# Patient Record
Sex: FEMALE | URBAN - NONMETROPOLITAN AREA
[De-identification: names, ages, dates, MRNs, and addresses within clinical notes are randomized per-mention and may not be internally consistent; named-entity substitution may affect disease eponyms.]

---

## 2023-10-03 ENCOUNTER — OFFICE VISIT (OUTPATIENT)
Dept: URBAN - NONMETROPOLITAN AREA CLINIC 4 | Facility: CLINIC | Age: 53
End: 2023-10-03
Payer: COMMERCIAL

## 2023-10-03 VITALS
HEART RATE: 64 BPM | DIASTOLIC BLOOD PRESSURE: 78 MMHG | TEMPERATURE: 97.3 F | SYSTOLIC BLOOD PRESSURE: 124 MMHG | HEIGHT: 64 IN | WEIGHT: 227.6 LBS | BODY MASS INDEX: 38.86 KG/M2

## 2023-10-03 DIAGNOSIS — R11.0 NAUSEA: ICD-10-CM

## 2023-10-03 DIAGNOSIS — K21.9 GASTROESOPHAGEAL REFLUX DISEASE WITHOUT ESOPHAGITIS: ICD-10-CM

## 2023-10-03 DIAGNOSIS — R13.19 ESOPHAGEAL DYSPHAGIA: ICD-10-CM

## 2023-10-03 PROBLEM — 40890009: Status: ACTIVE | Noted: 2023-10-03

## 2023-10-03 PROBLEM — 266435005: Status: ACTIVE | Noted: 2023-10-03

## 2023-10-03 PROBLEM — 422587007: Status: ACTIVE | Noted: 2023-10-03

## 2023-10-03 PROCEDURE — 99245 OFF/OP CONSLTJ NEW/EST HI 55: CPT | Performed by: PHYSICIAN ASSISTANT

## 2023-10-03 PROCEDURE — 99205 OFFICE O/P NEW HI 60 MIN: CPT | Performed by: PHYSICIAN ASSISTANT

## 2023-10-03 RX ORDER — SUCRALFATE 1 G/1
1 TABLET ON AN EMPTY STOMACH TABLET ORAL TWICE A DAY
Status: ACTIVE | COMMUNITY

## 2023-10-03 RX ORDER — FAMOTIDINE 10 MG/1
1 TABLET AS NEEDED TABLET, FILM COATED ORAL TWICE A DAY
Qty: 60 TABLET | Refills: 0 | OUTPATIENT
Start: 2023-10-03

## 2023-10-03 RX ORDER — SUCRALFATE 1 G/10ML
10 ML 1 HOUR BEFORE MEALS AND AT BEDTIME ON AN EMPTY STOMACH SUSPENSION ORAL
Qty: 1200 | OUTPATIENT
Start: 2023-10-03 | End: 2023-11-02

## 2023-10-03 RX ORDER — OMEPRAZOLE 40 MG/1
1 CAPSULE 30 MINUTES BEFORE MORNING MEAL CAPSULE, DELAYED RELEASE ORAL ONCE A DAY
Status: ACTIVE | COMMUNITY

## 2023-10-03 RX ORDER — ONDANSETRON 4 MG/1
1 TABLET ON THE TONGUE AND ALLOW TO DISSOLVE TABLET, ORALLY DISINTEGRATING ORAL EVERY 6 HOURS
Qty: 60 TABLET | Refills: 1 | OUTPATIENT
Start: 2023-10-03

## 2023-10-03 RX ORDER — OXYCODONE HYDROCHLORIDE AND ACETAMINOPHEN 5; 325 MG/1; MG/1
1 TABLET AS NEEDED TABLET ORAL
Status: ACTIVE | COMMUNITY

## 2023-10-03 NOTE — HPI-TODAY'S VISIT:
Mrs Sotelo is a pleasant 53 year old female with past medical history significant for gerd, who presents to the office today secondary 2 persistent and worsening epigastric abdominal discomfort, inability to tolerate PO intake and significant nausea.  She was previously seen in 2022 by Dr. Alvarado, and after that evaluation also had a repeat visit at [facility] in December of 2022 for persistent symptoms, where endoscopy was performed which revealed gastritis and duodenitis with biopsies negative for malignancy nor H pylori.  She was prescribed omeprazole, but unfortunately stopped taking the medication due to worsening of her nausea, and after her recent hospitalization was given carafate tablets but unfortunately is unable to tolerate swallowing the tablets.  there's no coffee ground emesis nor any bloody emesis  she is up to date on screening colonoscopy, those records are pending

## 2023-10-05 ENCOUNTER — TELEPHONE ENCOUNTER (OUTPATIENT)
Dept: URBAN - NONMETROPOLITAN AREA CLINIC 4 | Facility: CLINIC | Age: 53
End: 2023-10-05

## 2023-10-12 ENCOUNTER — CLAIMS CREATED FROM THE CLAIM WINDOW (OUTPATIENT)
Dept: URBAN - METROPOLITAN AREA SURGERY CENTER 14 | Facility: SURGERY CENTER | Age: 53
End: 2023-10-12
Payer: COMMERCIAL

## 2023-10-12 ENCOUNTER — CLAIMS CREATED FROM THE CLAIM WINDOW (OUTPATIENT)
Dept: URBAN - METROPOLITAN AREA CLINIC 4 | Facility: CLINIC | Age: 53
End: 2023-10-12
Payer: COMMERCIAL

## 2023-10-12 DIAGNOSIS — R10.13 ABDOMINAL DISCOMFORT, EPIGASTRIC: ICD-10-CM

## 2023-10-12 DIAGNOSIS — K31.89 OTHER DISEASES OF STOMACH AND DUODENUM: ICD-10-CM

## 2023-10-12 DIAGNOSIS — R13.10 DYSPHAGIA: ICD-10-CM

## 2023-10-12 DIAGNOSIS — K31.89 ACHYLIA: ICD-10-CM

## 2023-10-12 DIAGNOSIS — R13.19 CERVICAL DYSPHAGIA: ICD-10-CM

## 2023-10-12 DIAGNOSIS — K29.40 ATROPHIC GASTRITIS: ICD-10-CM

## 2023-10-12 DIAGNOSIS — K29.40 CHRONIC ATROPHIC GASTRITIS WITHOUT BLEEDING: ICD-10-CM

## 2023-10-12 PROCEDURE — G8907 PT DOC NO EVENTS ON DISCHARG: HCPCS | Performed by: INTERNAL MEDICINE

## 2023-10-12 PROCEDURE — 88305 TISSUE EXAM BY PATHOLOGIST: CPT | Performed by: PATHOLOGY

## 2023-10-12 PROCEDURE — 00731 ANES UPR GI NDSC PX NOS: CPT | Performed by: REGISTERED NURSE

## 2023-10-12 PROCEDURE — 88342 IMHCHEM/IMCYTCHM 1ST ANTB: CPT | Performed by: PATHOLOGY

## 2023-10-12 PROCEDURE — 43239 EGD BIOPSY SINGLE/MULTIPLE: CPT | Performed by: INTERNAL MEDICINE

## 2023-10-12 PROCEDURE — 43248 EGD GUIDE WIRE INSERTION: CPT | Performed by: INTERNAL MEDICINE

## 2023-10-12 PROCEDURE — 88341 IMHCHEM/IMCYTCHM EA ADD ANTB: CPT | Performed by: PATHOLOGY

## 2023-10-12 RX ORDER — SUCRALFATE 1 G/1
1 TABLET ON AN EMPTY STOMACH TABLET ORAL TWICE A DAY
Status: ACTIVE | COMMUNITY

## 2023-10-12 RX ORDER — FAMOTIDINE 10 MG/1
1 TABLET AS NEEDED TABLET, FILM COATED ORAL TWICE A DAY
Qty: 60 TABLET | Refills: 0 | Status: ACTIVE | COMMUNITY
Start: 2023-10-03

## 2023-10-12 RX ORDER — SUCRALFATE 1 G/10ML
10 ML 1 HOUR BEFORE MEALS AND AT BEDTIME ON AN EMPTY STOMACH SUSPENSION ORAL
Qty: 1200 | Status: ACTIVE | COMMUNITY
Start: 2023-10-03 | End: 2023-11-02

## 2023-10-12 RX ORDER — ONDANSETRON 4 MG/1
1 TABLET ON THE TONGUE AND ALLOW TO DISSOLVE TABLET, ORALLY DISINTEGRATING ORAL EVERY 6 HOURS
Qty: 60 TABLET | Refills: 1 | Status: ACTIVE | COMMUNITY
Start: 2023-10-03

## 2023-10-12 RX ORDER — OXYCODONE HYDROCHLORIDE AND ACETAMINOPHEN 5; 325 MG/1; MG/1
1 TABLET AS NEEDED TABLET ORAL
Status: ACTIVE | COMMUNITY

## 2023-10-12 RX ORDER — OMEPRAZOLE 40 MG/1
1 CAPSULE 30 MINUTES BEFORE MORNING MEAL CAPSULE, DELAYED RELEASE ORAL ONCE A DAY
Status: ACTIVE | COMMUNITY

## 2023-10-15 ENCOUNTER — WEB ENCOUNTER (OUTPATIENT)
Dept: URBAN - NONMETROPOLITAN AREA CLINIC 4 | Facility: CLINIC | Age: 53
End: 2023-10-15

## 2023-10-16 ENCOUNTER — OFFICE VISIT (OUTPATIENT)
Dept: URBAN - METROPOLITAN AREA SURGERY CENTER 14 | Facility: SURGERY CENTER | Age: 53
End: 2023-10-16

## 2023-10-16 ENCOUNTER — WEB ENCOUNTER (OUTPATIENT)
Dept: URBAN - NONMETROPOLITAN AREA CLINIC 4 | Facility: CLINIC | Age: 53
End: 2023-10-16

## 2023-10-18 ENCOUNTER — TELEPHONE ENCOUNTER (OUTPATIENT)
Dept: URBAN - METROPOLITAN AREA CLINIC 54 | Facility: CLINIC | Age: 53
End: 2023-10-18

## 2023-10-18 PROBLEM — 84568007: Status: ACTIVE | Noted: 2023-10-18

## 2023-10-26 LAB
FOLATE (FOLIC ACID), SERUM: 11.8
HEMATOCRIT: 36.6
HEMOGLOBIN: 12.3
MCH: 28
MCHC: 33.6
MCV: 83.4
MPV: 10.1
PLATELET COUNT: 358
RDW: 12.7
RED BLOOD CELL COUNT: 4.39
VITAMIN B12: 315
WHITE BLOOD CELL COUNT: 8.7

## 2023-11-01 ENCOUNTER — TELEPHONE ENCOUNTER (OUTPATIENT)
Dept: URBAN - NONMETROPOLITAN AREA CLINIC 4 | Facility: CLINIC | Age: 53
End: 2023-11-01

## 2023-11-13 ENCOUNTER — OFFICE VISIT (OUTPATIENT)
Dept: URBAN - NONMETROPOLITAN AREA CLINIC 4 | Facility: CLINIC | Age: 53
End: 2023-11-13

## 2023-11-21 ENCOUNTER — WEB ENCOUNTER (OUTPATIENT)
Dept: URBAN - NONMETROPOLITAN AREA CLINIC 4 | Facility: CLINIC | Age: 53
End: 2023-11-21

## 2023-11-21 ENCOUNTER — TELEPHONE ENCOUNTER (OUTPATIENT)
Dept: URBAN - METROPOLITAN AREA CLINIC 54 | Facility: CLINIC | Age: 53
End: 2023-11-21

## 2023-11-22 ENCOUNTER — LAB OUTSIDE AN ENCOUNTER (OUTPATIENT)
Dept: URBAN - METROPOLITAN AREA CLINIC 54 | Facility: CLINIC | Age: 53
End: 2023-11-22

## 2023-12-11 ENCOUNTER — OFFICE VISIT (OUTPATIENT)
Dept: URBAN - NONMETROPOLITAN AREA CLINIC 4 | Facility: CLINIC | Age: 53
End: 2023-12-11
Payer: COMMERCIAL

## 2023-12-11 ENCOUNTER — LAB OUTSIDE AN ENCOUNTER (OUTPATIENT)
Dept: URBAN - NONMETROPOLITAN AREA CLINIC 4 | Facility: CLINIC | Age: 53
End: 2023-12-11

## 2023-12-11 ENCOUNTER — DASHBOARD ENCOUNTERS (OUTPATIENT)
Age: 53
End: 2023-12-11

## 2023-12-11 ENCOUNTER — TELEPHONE ENCOUNTER (OUTPATIENT)
Dept: URBAN - METROPOLITAN AREA CLINIC 54 | Facility: CLINIC | Age: 53
End: 2023-12-11

## 2023-12-11 VITALS
SYSTOLIC BLOOD PRESSURE: 138 MMHG | HEART RATE: 93 BPM | HEIGHT: 64 IN | TEMPERATURE: 98.2 F | WEIGHT: 219.6 LBS | DIASTOLIC BLOOD PRESSURE: 85 MMHG | BODY MASS INDEX: 37.49 KG/M2

## 2023-12-11 DIAGNOSIS — K29.40 AUTOIMMUNE GASTRITIS: ICD-10-CM

## 2023-12-11 DIAGNOSIS — K22.4 ESOPHAGEAL DYSMOTILITY: ICD-10-CM

## 2023-12-11 PROBLEM — 266434009: Status: ACTIVE | Noted: 2023-12-11

## 2023-12-11 PROCEDURE — 99214 OFFICE O/P EST MOD 30 MIN: CPT | Performed by: INTERNAL MEDICINE

## 2023-12-11 RX ORDER — SUCRALFATE 1 G/1
1 TABLET ON AN EMPTY STOMACH TABLET ORAL TWICE A DAY
Status: ON HOLD | COMMUNITY

## 2023-12-11 RX ORDER — ONDANSETRON 4 MG/1
1 TABLET ON THE TONGUE AND ALLOW TO DISSOLVE TABLET, ORALLY DISINTEGRATING ORAL EVERY 6 HOURS
Qty: 60 TABLET | Refills: 1 | Status: ON HOLD | COMMUNITY
Start: 2023-10-03

## 2023-12-11 RX ORDER — OXYCODONE HYDROCHLORIDE AND ACETAMINOPHEN 5; 325 MG/1; MG/1
1 TABLET AS NEEDED TABLET ORAL
Status: ON HOLD | COMMUNITY

## 2023-12-11 RX ORDER — FAMOTIDINE 10 MG/1
1 TABLET AS NEEDED TABLET, FILM COATED ORAL TWICE A DAY
Qty: 60 TABLET | Refills: 0 | Status: ON HOLD | COMMUNITY
Start: 2023-10-03

## 2023-12-11 RX ORDER — OMEPRAZOLE 40 MG/1
1 CAPSULE 30 MINUTES BEFORE MORNING MEAL CAPSULE, DELAYED RELEASE ORAL ONCE A DAY
Status: ON HOLD | COMMUNITY

## 2023-12-11 NOTE — HPI-TODAY'S VISIT:
Mrs Sotelo is a pleasant 53 year old female with past medical history significant for gerd, who presents to the office today secondary 2 persistent and worsening epigastric abdominal discomfort, inability to tolerate PO intake and significant nausea.  She was previously seen in 2022 by Dr. Alvarado, and after that evaluation also had a repeat visit at [facility] in December of 2022 for persistent symptoms, where endoscopy was performed which revealed gastritis and duodenitis with biopsies negative for malignancy nor H pylori.  She was prescribed omeprazole, but unfortunately stopped taking the medication due to worsening of her nausea, and after her recent hospitalization was given carafate tablets but unfortunately is unable to tolerate swallowing the tablets.  there's no coffee ground emesis nor any bloody emesis  she is up to date on screening colonoscopy, those records are pending  12-11-23 Pt with epigastric pain radiating to the back with radiation to the ear.  Pt reports that the carafate made her worse.  Pt had a dilation of the esophagus.  Pt reports that she had no improvement. neg cardiac w/u. Not taking opiate therapy  Had colonoscopy with Dr. Royal- a few years ago. Unable to recall when the repeat colonoscopy is due.

## 2023-12-12 ENCOUNTER — TELEPHONE ENCOUNTER (OUTPATIENT)
Dept: URBAN - METROPOLITAN AREA CLINIC 23 | Facility: CLINIC | Age: 53
End: 2023-12-12

## 2023-12-12 LAB
ANA SCREEN, IFA: NEGATIVE
C-REACTIVE PROTEIN, QUANT: 18.9
RHEUMATOID FACTOR: <14
SJOGREN'S ANTIBODY (SS-A): (no result)
SJOGREN'S ANTIBODY (SS-B): (no result)

## 2023-12-13 ENCOUNTER — WEB ENCOUNTER (OUTPATIENT)
Dept: URBAN - METROPOLITAN AREA CLINIC 54 | Facility: CLINIC | Age: 53
End: 2023-12-13

## 2023-12-14 ENCOUNTER — WEB ENCOUNTER (OUTPATIENT)
Dept: URBAN - METROPOLITAN AREA CLINIC 54 | Facility: CLINIC | Age: 53
End: 2023-12-14

## 2023-12-20 ENCOUNTER — WEB ENCOUNTER (OUTPATIENT)
Dept: URBAN - NONMETROPOLITAN AREA CLINIC 4 | Facility: CLINIC | Age: 53
End: 2023-12-20

## 2023-12-21 ENCOUNTER — WEB ENCOUNTER (OUTPATIENT)
Dept: URBAN - NONMETROPOLITAN AREA CLINIC 4 | Facility: CLINIC | Age: 53
End: 2023-12-21

## 2023-12-22 ENCOUNTER — WEB ENCOUNTER (OUTPATIENT)
Dept: URBAN - NONMETROPOLITAN AREA CLINIC 4 | Facility: CLINIC | Age: 53
End: 2023-12-22

## 2024-08-09 ENCOUNTER — OFFICE VISIT (OUTPATIENT)
Dept: URBAN - METROPOLITAN AREA CLINIC 23 | Facility: CLINIC | Age: 54
End: 2024-08-09
Payer: COMMERCIAL

## 2024-08-09 VITALS
SYSTOLIC BLOOD PRESSURE: 124 MMHG | HEIGHT: 64 IN | BODY MASS INDEX: 38.58 KG/M2 | WEIGHT: 226 LBS | DIASTOLIC BLOOD PRESSURE: 70 MMHG | TEMPERATURE: 98.6 F | HEART RATE: 70 BPM

## 2024-08-09 DIAGNOSIS — K22.89 ESOPHAGEAL DILATATION: ICD-10-CM

## 2024-08-09 DIAGNOSIS — R10.13 EPIGASTRIC PAIN: ICD-10-CM

## 2024-08-09 DIAGNOSIS — R12 HEARTBURN: ICD-10-CM

## 2024-08-09 DIAGNOSIS — K22.4 ESOPHAGEAL DYSMOTILITY: ICD-10-CM

## 2024-08-09 DIAGNOSIS — K21.9 CHRONIC GERD: ICD-10-CM

## 2024-08-09 PROBLEM — 235595009: Status: ACTIVE | Noted: 2024-08-09

## 2024-08-09 PROCEDURE — 99214 OFFICE O/P EST MOD 30 MIN: CPT | Performed by: INTERNAL MEDICINE

## 2024-08-09 RX ORDER — FAMOTIDINE 20 MG/1
1 TABLET AT BEDTIME TABLET, FILM COATED ORAL ONCE A DAY
Qty: 30 | Refills: 2 | OUTPATIENT
Start: 2024-08-09

## 2024-08-09 RX ORDER — PANTOPRAZOLE SODIUM 40 MG/1
1 TABLET TABLET, DELAYED RELEASE ORAL ONCE A DAY
Qty: 90 TABLET | Refills: 3 | OUTPATIENT
Start: 2024-08-09

## 2024-08-09 NOTE — HPI-TODAY'S VISIT:
54 year old female with past medical history significant for gerd, abdominal pain bloating  Reason for consultation: GERD, acid reflux, autoimmune gastritis   - Symptoms: Acid reflux, difficulty swallowing (solids liquids) her symptoms has worse and recently she is taking significant amount of Tums that she is not on PPI or H2 blocker   - Current treatment: Tums (avg 15/day)   - Previous treatments: Omeprazole, pantoprazole (didn't take long enough)   - Associated sx: Fecal incontinence  - PMHx: HTN (controlled) - Medications: None currently (non-adherent to prescribed PPIs) She had upper endoscopy by Dr. 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  She was previously seen in 2022 by Dr. Alvarado,  where endoscopy was performed which revealed gastritis and duodenitis with biopsies negative for malignancy nor H pylori.  She was prescribed omeprazole, but unfortunately stopped taking the medication due to worsening of her nausea,   Had colonoscopy with Dr. Royal- a few years ago. Unable to recall when the repeat colonoscopy is due.   She had CT scan and HIDA scan at [facility] in 2023 was unremarkable except for fibroid and small spleen cyst

## 2024-08-12 ENCOUNTER — TELEPHONE ENCOUNTER (OUTPATIENT)
Dept: URBAN - METROPOLITAN AREA CLINIC 23 | Facility: CLINIC | Age: 54
End: 2024-08-12

## 2024-08-27 ENCOUNTER — OFFICE VISIT (OUTPATIENT)
Dept: URBAN - METROPOLITAN AREA CLINIC 23 | Facility: CLINIC | Age: 54
End: 2024-08-27

## 2024-08-30 ENCOUNTER — TELEPHONE ENCOUNTER (OUTPATIENT)
Dept: URBAN - METROPOLITAN AREA CLINIC 23 | Facility: CLINIC | Age: 54
End: 2024-08-30

## 2024-09-01 ENCOUNTER — LAB OUTSIDE AN ENCOUNTER (OUTPATIENT)
Dept: URBAN - METROPOLITAN AREA CLINIC 23 | Facility: CLINIC | Age: 54
End: 2024-09-01

## 2024-10-25 ENCOUNTER — OFFICE VISIT (OUTPATIENT)
Dept: URBAN - METROPOLITAN AREA CLINIC 23 | Facility: CLINIC | Age: 54
End: 2024-10-25

## 2025-05-29 ENCOUNTER — TELEPHONE ENCOUNTER (OUTPATIENT)
Dept: URBAN - METROPOLITAN AREA CLINIC 23 | Facility: CLINIC | Age: 55
End: 2025-05-29

## 2025-06-10 ENCOUNTER — LAB OUTSIDE AN ENCOUNTER (OUTPATIENT)
Dept: URBAN - METROPOLITAN AREA CLINIC 35 | Facility: CLINIC | Age: 55
End: 2025-06-10

## 2025-06-10 ENCOUNTER — OFFICE VISIT (OUTPATIENT)
Dept: URBAN - METROPOLITAN AREA CLINIC 35 | Facility: CLINIC | Age: 55
End: 2025-06-10
Payer: COMMERCIAL

## 2025-06-10 DIAGNOSIS — R68.81 EARLY SATIETY: ICD-10-CM

## 2025-06-10 DIAGNOSIS — K21.9 CHRONIC GERD: ICD-10-CM

## 2025-06-10 DIAGNOSIS — K29.40 AUTOIMMUNE GASTRITIS: ICD-10-CM

## 2025-06-10 DIAGNOSIS — R14.0 ABDOMINAL BLOATING: ICD-10-CM

## 2025-06-10 PROCEDURE — 99214 OFFICE O/P EST MOD 30 MIN: CPT | Performed by: PHYSICIAN ASSISTANT

## 2025-06-10 RX ORDER — FAMOTIDINE 20 MG/1
1 TABLET AT BEDTIME TABLET, FILM COATED ORAL ONCE A DAY
Qty: 30 | Refills: 2 | OUTPATIENT
Start: 2025-06-10

## 2025-06-10 RX ORDER — FAMOTIDINE 20 MG/1
1 TABLET AT BEDTIME TABLET, FILM COATED ORAL ONCE A DAY
Qty: 30 | Refills: 2 | Status: ACTIVE | COMMUNITY
Start: 2024-08-09

## 2025-06-10 RX ORDER — MONTELUKAST SODIUM 10 MG/1
1 TABLET TABLET, FILM COATED ORAL ONCE A DAY
Status: ACTIVE | COMMUNITY

## 2025-06-10 RX ORDER — PANTOPRAZOLE SODIUM 40 MG/1
1 TABLET TABLET, DELAYED RELEASE ORAL ONCE A DAY
Qty: 90 TABLET | Refills: 3 | Status: ACTIVE | COMMUNITY
Start: 2024-08-09

## 2025-06-10 RX ORDER — PANTOPRAZOLE SODIUM 40 MG/1
1 TABLET TABLET, DELAYED RELEASE ORAL ONCE A DAY
Qty: 90 TABLET | Refills: 3 | OUTPATIENT
Start: 2025-06-10

## 2025-06-10 NOTE — HPI-GERD
Patient presents today with history of signifcant GERD and gastritis. She previously saw Dr. Mtz. She admits taking Pantoprazole Sodium Tablet Delayed Release, 40 MG and Pepcid Tablet, 20 MG. She admits some control of symptoms. in 2022 she had an EGD done by Dr. Alvarado,  where endoscopy was performed revealed gastritis and duodenitis with biopsies negative for malignancy nor H pylori. She admits some nausea and vomiting. She admits she is under the care of a bariatric sugeon. Patient denies dyspepsia, dysphagia, excessive belching, globus, sour eructations, bloating/gas, early satiety, coughing, abdominal/epigastric pain, or changes in bowel habits.   Provider notes Patient states she was told she had possible autoimmune gastritis on her EGD in 2023. She had an EGD in 2024 with a female Dr. Cook, and was never told anything about autoimmune gastritis.  She takes Pantoprazole 40mg and Pepcid 20mg daily.  Of note she is following with a bariatric surgeon, and was told to hold these two medications for the next couple weeks until she has labs drawn.  She admits she has chronic nausea. She will occasional vomit, post prandially and also sporadically.  She admits to feeling full quickly.  She admits to bloating/gas.  Patient admits to having early satiety for "quite a while". She denies any abdominal pain.  Has never had a gastric emptying study. Patient is pre-diabetic.   Visit with Dr. Mtz (8/9/2024): 54 year old female with past medical history significant for gerd, abdominal pain bloating  Reason for consultation: GERD, acid reflux, autoimmune gastritis   - Symptoms: Acid reflux, difficulty swallowing (solids liquids) her symptoms has worse and recently she is taking significant amount of Tums that she is not on PPI or H2 blocker   - Current treatment: Tums (avg 15/day)   - Previous treatments: Omeprazole, pantoprazole (didn't take long enough)   - Associated sx: Fecal incontinence  - PMHx: HTN (controlled) - Medications: None currently (non-adherent to prescribed PPIs) She had upper endoscopy by Dr. 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  She was previously seen in 2022 by Dr. Alvarado,  where endoscopy was performed which revealed gastritis and duodenitis with biopsies negative for malignancy nor H pylori.  She was prescribed omeprazole, but unfortunately stopped taking the medication due to worsening of her nausea,   Had colonoscopy with Dr. Royal- a few years ago. Unable to recall when the repeat colonoscopy is due.   She had CT scan and HIDA scan at [facility] in 2023 was unremarkable except for fibroid and small spleen cyst

## 2025-06-14 LAB
ANTIPARIETAL CELL ANTIBODY: 59.2
FERRITIN, SERUM: 56
FOLATE, SERUM: 20.8
INTRINSIC FACTOR BLOCKING: POSITIVE
IRON BIND.CAP.(TIBC): 287
IRON SATURATION: 17
IRON: 48
VITAMIN B12: 448

## 2025-06-16 ENCOUNTER — TELEPHONE ENCOUNTER (OUTPATIENT)
Dept: URBAN - METROPOLITAN AREA CLINIC 35 | Facility: CLINIC | Age: 55
End: 2025-06-16

## 2025-06-17 ENCOUNTER — TELEPHONE ENCOUNTER (OUTPATIENT)
Dept: URBAN - METROPOLITAN AREA CLINIC 35 | Facility: CLINIC | Age: 55
End: 2025-06-17

## 2025-06-18 ENCOUNTER — TELEPHONE ENCOUNTER (OUTPATIENT)
Dept: URBAN - METROPOLITAN AREA CLINIC 35 | Facility: CLINIC | Age: 55
End: 2025-06-18

## 2025-06-23 ENCOUNTER — LAB OUTSIDE AN ENCOUNTER (OUTPATIENT)
Dept: URBAN - METROPOLITAN AREA CLINIC 35 | Facility: CLINIC | Age: 55
End: 2025-06-23

## 2025-06-23 ENCOUNTER — TELEPHONE ENCOUNTER (OUTPATIENT)
Dept: URBAN - METROPOLITAN AREA CLINIC 35 | Facility: CLINIC | Age: 55
End: 2025-06-23

## 2025-07-04 ENCOUNTER — WEB ENCOUNTER (OUTPATIENT)
Dept: URBAN - METROPOLITAN AREA CLINIC 35 | Facility: CLINIC | Age: 55
End: 2025-07-04

## 2025-08-11 ENCOUNTER — OFFICE VISIT (OUTPATIENT)
Dept: URBAN - METROPOLITAN AREA MEDICAL CENTER 27 | Facility: MEDICAL CENTER | Age: 55
End: 2025-08-11